# Patient Record
Sex: FEMALE | Race: WHITE | Employment: UNEMPLOYED | URBAN - METROPOLITAN AREA
[De-identification: names, ages, dates, MRNs, and addresses within clinical notes are randomized per-mention and may not be internally consistent; named-entity substitution may affect disease eponyms.]

---

## 2017-02-05 ENCOUNTER — HOSPITAL ENCOUNTER (EMERGENCY)
Age: 22
Discharge: HOME | End: 2017-02-05
Attending: PEDIATRICS | Admitting: PEDIATRICS
Payer: SELF-PAY

## 2017-02-05 ENCOUNTER — APPOINTMENT (OUTPATIENT)
Dept: GENERAL RADIOLOGY | Age: 22
End: 2017-02-05
Attending: PHYSICIAN ASSISTANT
Payer: SELF-PAY

## 2017-02-05 VITALS
DIASTOLIC BLOOD PRESSURE: 71 MMHG | TEMPERATURE: 98.5 F | RESPIRATION RATE: 26 BRPM | WEIGHT: 221.78 LBS | SYSTOLIC BLOOD PRESSURE: 118 MMHG | OXYGEN SATURATION: 100 % | HEART RATE: 119 BPM
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
## 2017-02-05 DIAGNOSIS — S99.912A LEFT ANKLE INJURY, INITIAL ENCOUNTER: ICD-10-CM

## 2017-02-05 PROCEDURE — 74011250637 HC RX REV CODE- 250/637: Performed by: PEDIATRICS

## 2017-02-05 PROCEDURE — 99283 EMERGENCY DEPT VISIT LOW MDM: CPT

## 2017-02-05 PROCEDURE — 73610 X-RAY EXAM OF ANKLE: CPT

## 2017-02-05 RX ORDER — IBUPROFEN 400 MG/1
800 TABLET ORAL
Status: COMPLETED | OUTPATIENT
Start: 2017-02-05 | End: 2017-02-05

## 2017-02-05 RX ADMIN — IBUPROFEN 800 MG: 400 TABLET, FILM COATED ORAL at 21:01

## 2017-02-06 NOTE — DISCHARGE INSTRUCTIONS
We hope that we have addressed all of your medical concerns. The examination and treatment you received in the Emergency Department were for an emergent problem and were not intended as complete care. It is important that you follow up with your healthcare provider(s) for ongoing care. If your symptoms worsen or do not improve as expected, and you are unable to reach your usual health care provider(s), you should return to the Emergency Department. Today's healthcare is undergoing tremendous change, and patient satisfaction surveys are one of the many tools to assess the quality of medical care. You may receive a survey from the [employer] regarding your experience in the Emergency Department. I hope that your experience has been completely positive, particularly the medical care that I provided. As such, please participate in the survey; anything less than excellent does not meet my expectations or intentions. [address] [facility] and [address] [place] [address] participate in nationally recognized quality of care measures. If your blood pressure is greater than 120/80, as reported below, we urge that you seek medical care to address the potential of high blood pressure, commonly known as hypertension. Hypertension can be hereditary or can be caused by certain medical conditions, pain, stress, or \"white coat syndrome. \"       Please make an appointment with your health care provider(s) for follow up of your Emergency Department visit. VITALS:   Patient Vitals for the past 8 hrs:   Temp Pulse Resp BP SpO2   02/05/17 2045 98.5 °F (36.9 °C) (!) 119 26 118/71 100 %          Thank you for allowing us to provide you with medical care today. We realize that you have many choices for your emergency care needs. Please choose us in the future for any continued health care needs. Regards,           April PATRICIA.  Keri, [address] [facility] [address] Office: [phone]            No results found for this or any previous visit (from the past 24 hour(s)). Xr Ankle Lt Min 3 V    Result Date: 2/5/2017  EXAM:  XR ANKLE LT MIN 3 V INDICATION:  fell down stairs, ? fracture. swelling and pain with wt bearing. Left ankle pain. COMPARISON: January 21, 2012. FINDINGS: Three views of the left ankle demonstrate no fracture or disruption of the ankle mortise. There is no other acute osseous or articular abnormality. The soft tissues are within normal limits. IMPRESSION: No acute abnormality. Ankle Sprain: Care Instructions  Your Care Instructions    An ankle sprain can happen when you twist your ankle. The ligaments that support the ankle can get stretched and torn. Often the ankle is swollen and painful. Ankle sprains may take from several weeks to several months to heal. Usually, the more pain and swelling you have, the more severe your ankle sprain is and the longer it will take to heal. You can heal faster and regain strength in your ankle with good home treatment. It is very important to give your ankle time to heal completely, so that you do not easily hurt your ankle again. Follow-up care is a key part of your treatment and safety. Be sure to make and go to all appointments, and call your doctor if you are having problems. It's also a good idea to know your test results and keep a list of the medicines you take. How can you care for yourself at home? · Prop up your foot on pillows as much as possible for the next 3 days. Try to keep your ankle above the level of your heart. This will help reduce the swelling. · Follow your doctor's directions for wearing a splint or elastic bandage. Wrapping the ankle may help reduce or prevent swelling. · Your doctor may give you a splint, a brace, an air stirrup, or another form of ankle support to protect your ankle until it is healed. Wear it as directed while your ankle is healing.  Do not remove it unless your doctor tells you to. After your ankle has healed, ask your doctor whether you should wear the brace when you exercise. · Put ice or cold packs on your injured ankle for 10 to 20 minutes at a time. Try to do this every 1 to 2 hours for the next 3 days (when you are awake) or until the swelling goes down. Put a thin cloth between the ice and your skin. · You may need to use crutches until you can walk without pain. If you do use crutches, try to bear some weight on your injured ankle if you can do so without pain. This helps the ankle heal.  · Take pain medicines exactly as directed. ¨ If the doctor gave you a prescription medicine for pain, take it as prescribed. ¨ If you are not taking a prescription pain medicine, ask your doctor if you can take an over-the-counter medicine. · If you have been given ankle exercises to do at home, do them exactly as instructed. These can promote healing and help prevent lasting weakness. When should you call for help? Call your doctor now or seek immediate medical care if:  · Your pain is getting worse. · Your swelling is getting worse. · Your splint feels too tight or you are unable to loosen it. Watch closely for changes in your health, and be sure to contact your doctor if:  · You are not getting better after 1 week. Where can you learn more? Go to http://carrillo-dexter.info/. Enter L625 in the search box to learn more about \"Ankle Sprain: Care Instructions. \"  Current as of: May 23, 2016  Content Version: 11.1  © 4374-5395 Healthwise, [employer]orporated. Care instructions adapted under license by [employer] (which disclaims liability or warranty for this information). If you have questions about a medical condition or this instruction, always ask your healthcare professional. [address] any warranty or liability for your use of this information.

## 2017-02-06 NOTE — ED PROVIDER NOTES
HPI Comments: 26 yo  female with medical history remarkable for asthma, bipolar 1 disorder, compression fracture of vertebra, depression and calcaneus fracture presenting ambulatory to the ED following a fall at home just PTA. Pt reports tripping down 6 exterior concrete steps, rolled down, possibly hit side of head on railing without LOC and twisted left ankle. Complaint of 10/10, left lateral ankle pain, sharp, constant, radiating into the toes, worse with weight bearing and palpation with minimal improvement with elevation. No other recognized injuries. No fever, headache, sore throat, cough, rhinorrhea, sneezing, SOB, abdominal pain, nausea, vomiting, or urinary complaints. Patient is a 25 y.o. female presenting with ankle problem. The history is provided by the patient. Ankle Injury    This is a new problem. The current episode started less than 1 hour ago. The problem occurs constantly. The problem has not changed since onset. Pain location: left lateral ankle. The quality of the pain is described as sharp. The pain is at a severity of 10/10. The pain is moderate. Associated symptoms include numbness (into toes) and limited range of motion (secondary to pain). Pertinent negatives include no tingling, no back pain and no neck pain. The symptoms are aggravated by standing. The treatment provided no relief. There has been a history of trauma (tripped down exterior stairs and \"rolled\" foot). Past Medical History:   Diagnosis Date    Asthma     Asthma     Bipolar 1 disorder ([facility] [address]     Compression fracture of lumbar vertebra (HCC)     Depression     Fracture closed, calcaneus        History reviewed. No pertinent past surgical history.       Family History:   Problem Relation Age of Onset    Diabetes Neg Hx     Thyroid Disease Neg Hx        Social History     Social History    Marital status: SINGLE     Spouse name: N/A    Number of children: N/A    Years of education: N/A Occupational History    Not on file. Social History Main Topics    Smoking status: Never Smoker    Smokeless tobacco: Not on file    Alcohol use No    Drug use: No    Sexual activity: Not on file     Other Topics Concern    Not on file     Social History Narrative         ALLERGIES: Venom-honey bee and Ceftin [cefuroxime axetil]    Review of Systems   Constitutional: Negative. Negative for chills, fatigue and fever. HENT: Negative. Negative for congestion, ear pain, facial swelling, rhinorrhea, sneezing and sore throat. Eyes: Negative for pain, discharge and itching. Respiratory: Negative for cough, chest tightness and shortness of breath. Cardiovascular: Negative. Negative for chest pain and leg swelling. Gastrointestinal: Negative. Negative for abdominal distention, abdominal pain, constipation, diarrhea, nausea and vomiting. Genitourinary: Negative for difficulty urinating, frequency and urgency. Musculoskeletal: Positive for arthralgias (left lateral ankle). Negative for back pain, joint swelling, neck pain and neck stiffness. Skin: Negative for color change and rash. Neurological: Positive for numbness (into toes). Negative for tingling and headaches. Psychiatric/Behavioral: Negative for confusion and decreased concentration. All other systems reviewed and are negative. Vitals:    02/05/17 2041 02/05/17 2045   BP:  118/71   Pulse:  (!) 119   Resp:  26   Temp:  98.5 °F (36.9 °C)   SpO2:  100%   Weight: 100.6 kg (221 lb 12.5 oz)             Physical Exam   Constitutional: She is oriented to person, place, and time. She appears well-developed and well-nourished. No distress. Obese  female seated on stretcher in NAD   HENT:   Head: Normocephalic and atraumatic. Right Ear: External ear normal.   Left Ear: External ear normal.   Nose: Nose normal.   Mouth/Throat: Oropharynx is clear and moist. No oropharyngeal exudate.    Eyes: Conjunctivae and EOM are normal. Pupils are equal, round, and reactive to light. Right eye exhibits no discharge. Left eye exhibits no discharge. No scleral icterus. Neck: Normal range of motion. Neck supple. Cardiovascular: Regular rhythm. Exam reveals no gallop and no friction rub. No murmur heard. Pulmonary/Chest: Effort normal and breath sounds normal. She has no wheezes. She has no rales. Abdominal: Soft. Bowel sounds are normal. She exhibits no distension. There is no tenderness. There is no rebound and no guarding. Musculoskeletal:        Left ankle: She exhibits decreased range of motion and swelling. She exhibits no ecchymosis, no deformity, no laceration and normal pulse. Tenderness. Lateral malleolus tenderness found. Feet:    Neurological: She is alert and oriented to person, place, and time. No cranial nerve deficit. Coordination normal.   Skin: Skin is warm and dry. She is not diaphoretic. Psychiatric: She has a normal mood and affect. Her behavior is normal.   Nursing note and vitals reviewed. MDM  Number of Diagnoses or Management Options  Diagnosis management comments: 24 yo  female with complaint of left ankle pain and swelling following a mechanical fall down stairs. Pt appears otherwise well w/out any other evidence of trauma. ? Sprain/strain vs fracture of left ankle    Plan  Xray and analgesia. [facility], [address] [facility] [place]         Amount and/or Complexity of Data Reviewed  Tests in the radiology section of CPT®: ordered and reviewed  Discuss the patient with other providers: yes (Dr. Malick Langston)  Independent visualization of images, tracings, or specimens: yes      ED Course       Procedures  Progress note    Patient's results have been reviewed with them.   Patient and/or family have verbally conveyed their understanding and agreement of the patient's signs, symptoms, diagnosis, treatment and prognosis and additionally agree to follow up as recommended or return to the Emergency Room should their condition change prior to follow-up. Discharge instructions have also been provided to the patient with some educational information regarding their diagnosis as well a list of reasons why they would want to return to the ER prior to their follow-up appointment should their condition change. Ryan Figueroa    Discussed case with attending Physician Rose Roberto. Agrees with care and will D/C with follow up. Ryan Sadler    A/P  Fall/ankle injury: Apply ice, elevate the foot. Ibuprofen 600 mg every 6 hrs as needed for pain. Use crutches to ambulate. Follow-up with regular doctor or ortho for re-eval if not improving in 48-72 hrs. Return for any new or worsening.  Ryan Thomson

## 2020-11-13 ENCOUNTER — OFFICE VISIT (OUTPATIENT)
Dept: RHEUMATOLOGY | Age: 25
End: 2020-11-13

## 2020-11-13 VITALS
WEIGHT: 257 LBS | DIASTOLIC BLOOD PRESSURE: 81 MMHG | BODY MASS INDEX: 50.45 KG/M2 | OXYGEN SATURATION: 99 % | HEART RATE: 100 BPM | SYSTOLIC BLOOD PRESSURE: 112 MMHG | HEIGHT: 60 IN | TEMPERATURE: 97.7 F | RESPIRATION RATE: 20 BRPM

## 2020-11-13 DIAGNOSIS — R77.8 ELEVATED TROPONIN LEVEL: ICD-10-CM

## 2020-11-13 DIAGNOSIS — J18.9 RECURRENT PNEUMONIA: Primary | ICD-10-CM

## 2020-11-13 DIAGNOSIS — M79.7 FIBROMYALGIA: ICD-10-CM

## 2020-11-13 DIAGNOSIS — M25.50 POLYARTHRALGIA: ICD-10-CM

## 2020-11-13 DIAGNOSIS — M47.816 LUMBAR FACET ARTHROPATHY: ICD-10-CM

## 2020-11-13 DIAGNOSIS — R06.09 DYSPNEA ON EXERTION: ICD-10-CM

## 2020-11-13 DIAGNOSIS — E66.01 OBESITY, MORBID (HCC): ICD-10-CM

## 2020-11-13 DIAGNOSIS — B99.9 RECURRENT INFECTIONS: ICD-10-CM

## 2020-11-13 PROCEDURE — 99205 OFFICE O/P NEW HI 60 MIN: CPT | Performed by: INTERNAL MEDICINE

## 2020-11-13 RX ORDER — PANTOPRAZOLE SODIUM 40 MG/1
40 TABLET, DELAYED RELEASE ORAL DAILY
COMMUNITY

## 2020-11-13 RX ORDER — CELECOXIB 100 MG/1
100 CAPSULE ORAL 2 TIMES DAILY
Qty: 60 CAP | Refills: 2 | Status: SHIPPED | OUTPATIENT
Start: 2020-11-13 | End: 2021-02-11

## 2020-11-13 NOTE — PATIENT INSTRUCTIONS
1. I don't see any clear evidence of systemic inflammatory disease today, though I agree your symptoms and history are concerning enough to warrant a broad look. 2. [facility] labs at your earliest convenience, I'm looking for evidence of immunodeficiency, ANCA vasculitis, and any evidence of heart inflammation. 3. For pain, keep working with your PCP--I do think you have fibromyalgia contributing to your pain, which can respond to treatments like duloxetine (Cymbalta), amitriptyline (Elavil), or gabapentin (Neurontin). 4. For your low back, I'm referring you to PT for lumbar facet arthropathy and compression fracture--I think TENS may be helpful. 5. Xrays of the hands and chest at your earliest convenience--any [facility] location would be fine so we could see the images. 6. Return in 3 months to see how you're doing, and talk about your results in more detail.

## 2020-11-27 ENCOUNTER — DOCUMENTATION ONLY (OUTPATIENT)
Dept: RHEUMATOLOGY | Age: 25
End: 2020-11-27

## 2020-11-27 NOTE — PROGRESS NOTES
The office received a fax from [facility] stating Celecoxib has been approved as of 11/19/2020, member# [id number].

## 2021-02-17 ENCOUNTER — TELEPHONE (OUTPATIENT)
Dept: RHEUMATOLOGY | Age: 26
End: 2021-02-17

## 2021-02-17 NOTE — TELEPHONE ENCOUNTER
Called patient on 2/17/2021 left voice message on patient voice mail box to call back into the office to change appt type to virtual vist due to expecting bad weather. sdh

## 2021-02-18 LAB
ALBUMIN SERPL-MCNC: 4.1 G/DL (ref 3.9–5)
ALBUMIN/GLOB SERPL: 1.5 {RATIO} (ref 1.2–2.2)
ALP SERPL-CCNC: 82 IU/L (ref 39–117)
ALT SERPL-CCNC: 24 IU/L (ref 0–32)
ANA SER QL IF: NEGATIVE
AST SERPL-CCNC: 19 IU/L (ref 0–40)
BASOPHILS # BLD AUTO: 0.1 X10E3/UL (ref 0–0.2)
BASOPHILS NFR BLD AUTO: 1 %
BILIRUB SERPL-MCNC: 0.4 MG/DL (ref 0–1.2)
BUN SERPL-MCNC: 10 MG/DL (ref 6–20)
BUN/CREAT SERPL: 15 (ref 9–23)
C-ANCA TITR SER IF: NORMAL TITER
CALCIUM SERPL-MCNC: 9.7 MG/DL (ref 8.7–10.2)
CCP IGA+IGG SERPL IA-ACNC: 5 UNITS (ref 0–19)
CHLORIDE SERPL-SCNC: 106 MMOL/L (ref 96–106)
CK SERPL-CCNC: 64 U/L (ref 32–182)
CO2 SERPL-SCNC: 23 MMOL/L (ref 20–29)
CREAT SERPL-MCNC: 0.67 MG/DL (ref 0.57–1)
CRP SERPL-MCNC: 9 MG/L (ref 0–10)
ENA SS-A AB SER IA-ACNC: <20 UNITS
EOSINOPHIL # BLD AUTO: 0.3 X10E3/UL (ref 0–0.4)
EOSINOPHIL NFR BLD AUTO: 4 %
ERYTHROCYTE [DISTWIDTH] IN BLOOD BY AUTOMATED COUNT: 13 % (ref 11.7–15.4)
ERYTHROCYTE [SEDIMENTATION RATE] IN BLOOD BY WESTERGREN METHOD: 13 MM/HR (ref 0–32)
GLOBULIN SER CALC-MCNC: 2.7 G/DL (ref 1.5–4.5)
GLUCOSE SERPL-MCNC: 76 MG/DL (ref 65–99)
HCT VFR BLD AUTO: 44.6 % (ref 34–46.6)
HGB BLD-MCNC: 15.2 G/DL (ref 11.1–15.9)
IGA SERPL-MCNC: 190 MG/DL (ref 87–352)
IGG SERPL-MCNC: 991 MG/DL (ref 586–1602)
IGM SERPL-MCNC: 97 MG/DL (ref 26–217)
IMM GRANULOCYTES # BLD AUTO: 0 X10E3/UL (ref 0–0.1)
IMM GRANULOCYTES NFR BLD AUTO: 0 %
LYMPHOCYTES # BLD AUTO: 3.5 X10E3/UL (ref 0.7–3.1)
LYMPHOCYTES NFR BLD AUTO: 38 %
MCH RBC QN AUTO: 29.3 PG (ref 26.6–33)
MCHC RBC AUTO-ENTMCNC: 34.1 G/DL (ref 31.5–35.7)
MCV RBC AUTO: 86 FL (ref 79–97)
MONOCYTES # BLD AUTO: 0.7 X10E3/UL (ref 0.1–0.9)
MONOCYTES NFR BLD AUTO: 8 %
NEUTROPHILS # BLD AUTO: 4.5 X10E3/UL (ref 1.4–7)
NEUTROPHILS NFR BLD AUTO: 49 %
P-ANCA ATYPICAL TITR SER IF: NORMAL TITER
P-ANCA TITR SER IF: NORMAL TITER
PLATELET # BLD AUTO: 318 X10E3/UL (ref 150–450)
POTASSIUM SERPL-SCNC: 4.5 MMOL/L (ref 3.5–5.2)
PROT SERPL-MCNC: 6.8 G/DL (ref 6–8.5)
RBC # BLD AUTO: 5.19 X10E6/UL (ref 3.77–5.28)
RHEUMATOID FACT SERPL-ACNC: <10 IU/ML (ref 0–13.9)
SODIUM SERPL-SCNC: 143 MMOL/L (ref 134–144)
TROPONIN I SERPL-MCNC: <0.01 NG/ML (ref 0–0.04)
WBC # BLD AUTO: 9.1 X10E3/UL (ref 3.4–10.8)

## 2021-02-23 ENCOUNTER — OFFICE VISIT (OUTPATIENT)
Dept: RHEUMATOLOGY | Age: 26
End: 2021-02-23
Payer: MEDICAID

## 2021-02-23 VITALS
OXYGEN SATURATION: 97 % | TEMPERATURE: 97 F | WEIGHT: 259 LBS | BODY MASS INDEX: 50.85 KG/M2 | DIASTOLIC BLOOD PRESSURE: 80 MMHG | RESPIRATION RATE: 20 BRPM | HEIGHT: 60 IN | SYSTOLIC BLOOD PRESSURE: 112 MMHG | HEART RATE: 94 BPM

## 2021-02-23 DIAGNOSIS — M25.551 RIGHT HIP PAIN: ICD-10-CM

## 2021-02-23 DIAGNOSIS — R06.09 DYSPNEA ON EXERTION: ICD-10-CM

## 2021-02-23 DIAGNOSIS — M47.816 LUMBAR FACET ARTHROPATHY: ICD-10-CM

## 2021-02-23 DIAGNOSIS — M25.50 POLYARTHRALGIA: Primary | ICD-10-CM

## 2021-02-23 DIAGNOSIS — M79.7 FIBROMYALGIA: ICD-10-CM

## 2021-02-23 DIAGNOSIS — J45.21 MILD INTERMITTENT ASTHMA WITH ACUTE EXACERBATION: ICD-10-CM

## 2021-02-23 PROCEDURE — 99215 OFFICE O/P EST HI 40 MIN: CPT | Performed by: INTERNAL MEDICINE

## 2021-02-23 RX ORDER — OMEPRAZOLE 40 MG/1
40 CAPSULE, DELAYED RELEASE ORAL DAILY
COMMUNITY

## 2021-02-23 NOTE — LETTER
3/2/2021 Patient: Sj Taveras YOB: 1995 Date of Visit: 2/23/2021 Ren Rojas [facility] [id number] Via Fax: [phone] Dear Alba Recinos PA-C, Thank you for referring Ms. Ruth Ren to [address] [place] [address] for evaluation. My notes for this consultation are attached. If you have questions, please do not hesitate to call me. I look forward to following your patient along with you.  
 
 
Sincerely, 
 
Claire Fisher MD

## 2021-02-23 NOTE — PROGRESS NOTES
Chief Complaint   Patient presents with    Joint Pain     back,     1. Have you been to the ER, urgent care clinic since your last visit? Hospitalized since your last visit? No    2. Have you seen or consulted any other health care providers outside of the [address] [place] [address] since your last visit? Include any pap smears or colon screening.  Yes Where: PCP

## 2021-02-23 NOTE — PATIENT INSTRUCTIONS
1. I don't see signs of autoimmune inflammation in your joints or blood work today. I'm concerned your hip pain may be related to repeated steroid use--let's obtain xrays of the hips, hands, and chest (looking for lymph nodes or new infiltrates) 2. For the recurrent bronchitis and history of pneumonia, please establish with Pulmonary ASAP, so they can help adjust your inhaler regimen and minimize these uses of prednisone tapers. I'm providing a referral, take this wherever your insurance accepts. 3. For your recurrent urinary tract infections, talk with your ob/gyn about whether there could a nidus of infection within the bladder or another anatomic predisposition, or whether your bladder symptoms could represent interstitial cystitis. 4. Keep working with your gastroenterologist on the recurrent vomiting management. 5. For joint pain, please encourage your physicians to avoid the use of quinolone-type antibiotics (cipro, levaquin, avelox) as these can rarely worsen joint and tendon pains. If your hand becomes acutely swollen and painful, let me know and we'll pursue an MRI. 6. Physical therapy for right hip and low back pains. 7. For fibromyalgia, we usually recommend treating with a combination of medications and lifestyle/activity modifications. Pain-directed medications such as gabapentin or Lyrica, antidepressant-class medications more effective for pain such as SNRI's (duloxetine or milnacripran),or TCA-type medications like Elavil (amitriptyline) which can help with headaches and sleep as well, are all first-line alternatives, and these can be sometimes used in careful combination. For activities, search for \"alek chi for arthritis\" for free 1-hour videos from Jeannette Stanford on [place], which has been shown to be as helpful as any medications we use when done for 30 minutes per day, and can be done in conjunction with medication management. Also, if you can find a compounding pharmacy that can make you naltrexone 4.5mg daily, this has been shown to be helpful for pain in fibromyalgia as well (Arthritis Rheum. 2013 Feb;65(2):529-38). Opiate-type medications have actually been shown to associate with worse pain in the long-term, so we recommend avoiding these for fibromyalgia-related pain. As we exclusively treat inflammatory autoimmune disease from this clinic, we rely on your primary care physician (sometimes in conjunction with a Pain Management physician) to manage fibromyalgia. 8. Return on an as-needed basis for new symptoms.

## 2021-02-23 NOTE — PROGRESS NOTES
REASON FOR VISIT:   Macy Yusuf is a 23 y.o. female with history of pneumonia who is returning for followup of recurrent pneumonia, aperiodic fevers, and recurrent vomiting with hematemesis. HISTORY OF PRESENT ILLNESS     Since last appointment 3mo ago in November has been treated three times for UTI's with suprapubic pressure, dysuria, and malaise; after first two treatments improved with antibiotics (cipro), after the 3rd needed a second round of antibiotics. Started prednisone for bronchitis, she thinks 2nd since around December, typically a 10-day taper. Still having joint pains, mostly hands and now increasingly the right hip over the last month, had to rest for 1-2 days before she was able to get back to moving. Morning-predominant, also late night are the worst. Difficult to work at [employer], now working reduced hours. Hasn't yet established with new Pulmonologist,   Says chest xray done by PCP for cough and chest pain was unremarkable. No hematemesis. Still having episodic vomiting usually with waking in the morning, then a spell will last about 24 hours. Attacks can be days or weeks between. Last attack was last Friday. Denies h/o marijuana use (\"once 5 years ago\"). Working with Jan Chu in Gastroenterology who is planning EGD and colonoscopy. No new rashes. Notices she bruises easily. Disease summary from initial visit:  Feels symptoms started around a 2018 hospitalization shortly after her mother had  from cancer; she was hospitalized for fever, cough, and eventually syncopal event. Diagnosed with pneumonia, LLL collapse [facility]). Within 2 months she was rehospitalized with similar complaints. Most recently says about 3 months ago she was admitted to the ICU for hematemesis c/b troponin elevation. Didn't require blood transfusion. Heart cath done and she says normal though c/b LUE DVT s/p 3mo of Eliquis.   Temps as high as 103 with these hospitalizations, though gets temperatures 100-101 usually at night, estimates 2-4 times per month. Hands seem swollen at night, has difficulty closing fist.   Gets rashes around her neck, last for 2-3 days. Gets red \"wings\" across the cheeks ever since childhood, has been reassured she doesn't have rosacea. Worse in the sun or if she is sick. Hands hurt and can swell at night but no extended morning stiffness. Gets \"lot of chest pain,\" feels easily winded ever since she was hospitalized in 2018; lifting something off of a shelf will make her short of breath and give her racing heart for a few seconds. Thinks could walk up one but not two flights of stairs. Follows with a Pulmonologist for mild intermittent asthma, her dyspnea feels different than her old wheezing though, so she seldom uses the albuterol. Dry cough persists between hospitalizations. Has gained about 50 pounds since these problems started (from 200 to 257). Feels she doesn't eat but weight doesn't go down. Last treatment with prednisone was about 2 months ago; she describes prednisone tapers about once every 2-3 months for presumed asthma exacerbations. At 14yo she says she sustained a calcaneal fracture, vertebral compression fractures, and concussion when she fell through a ceiling and landed on her feet below. Most recent available lumbar CT from 2012 did not detect a vertebral compression fracture. 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 Doesn't follow with eye doctor currently. REVIEW OF SYSTEMS  Negatives as follows:  CONSTITUTlONAL:    +fevers, fatigue, weight gain. HEAD/EYES:              +blurred vision, h/a. Denies eye redness, dry eyes, temporal pain  ENT:                            +angular cheilitis by history, childhood sinus infections (none recent). Denies oral/nasal ulcers, dry mouth, hearing changes. RESPIRATORY:         No pleuritic pain, history of pleural effusions, hemoptysis  CARDIOVASCULAR:             Denies chest pain, history of pericardial effusions  GASTRO:                    Denies heartburn, esophageal dysmotility, abdominal pain, nausea, vomiting, diarrhea, blood in the stool  HEMATOLOGIC:        No easy bruising, purpura, swollen lymph nodes  SKIN:                           Denies alopecia, ulcers, nodules   VASCULAR:                +UE edema. Denies cyanosis, raynaud phenomenon  NEUROLOGIC:           Denies specific muscle weakness, paresthesias   PSYCHIATRIC:           +sleep latency. No snoring, depression, anxiety  MSK:                           +paralumbar pain. No morning stiffness >=1 hour, SI joint pain  Review of systems is as above and is otherwise negative. ALLERGIES  Venom-honey bee and Ceftin [cefuroxime axetil]    MEDICATIONS  Current Outpatient Medications   Medication Sig    omeprazole (PRILOSEC) 40 mg capsule Take 40 mg by mouth daily.  EPINEPHrine (EPIPEN) 0.3 mg/0.3 mL injection 0.3 mL by IntraMUSCular route once as needed for 1 dose.  albuterol (PROVENTIL, VENTOLIN) 90 mcg/Actuation inhaler Take 2 Puffs by inhalation every six (6) hours as needed.  pantoprazole (Protonix) 40 mg tablet Take 40 mg by mouth daily. No current facility-administered medications for this visit. PAST MEDICAL HISTORY  Past Medical History:   Diagnosis Date    Asthma     Asthma     Bipolar 1 disorder ([facility] [address]     Compression fracture of lumbar vertebra (HCC)     Depression     Fracture closed, calcaneus        FAMILY HISTORY  family history includes Arthritis-rheumatoid in her mother; Lupus in her mother. Mother had lupus and rheumatoid arthritis, passed 2/2 complications of breast cancer. Maternal GM had MS. SOCIAL HISTORY  She  reports that she has quit smoking.  She has never used smokeless tobacco. She reports that she does not drink alcohol or use drugs. 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 DATA  Visit Vitals  /80 (BP 1 Location: Right arm, BP Patient Position: Sitting)   Pulse 94   Temp 97 °F (36.1 °C) (Oral)   Resp 20   Ht 5' (1.524 m)   Wt 259 lb (117.5 kg)   LMP 11/23/2020   SpO2 97%   BMI 50.58 kg/m²    Body mass index is 50.58 kg/m². No flowsheet data found. mHAQ 0.875    General:  The patient is well developed, well nourished, alert, and in no apparent distress. Eyes:  Sclera are anicteric. No conjunctival injection. HEENT:  Oropharynx is clear. No oral ulcers. Adequate salivary pooling. No cervical or supraclavicular lymphadenopathy. Lungs:  Clear to auscultation bilaterally, without wheeze or stridor. Normal respiratory effort. Cor:  Regular rate and rhythm. No murmur rub or gallop. Abdomen: Soft, non-tender, without hepatomegaly or masses. Extremities: No calf tenderness or edema. Mild pretibial tenderness, trace edema. No erythema nodosum. Warm and well perfused. Skin:  No malar erythema. No psoriaform patches. Grossly normal nailfold capillaries without sclerodactyly or puffy fingers. Neuro: +SLR on right. No foot or wrist drop. Musculoskeletal:    A comprehensive musculoskeletal exam was performed for all joints of each upper and lower extremity and assessed for swelling, tenderness and range of motion. Results are documented as below:  Pan-positive FMTP. Tenderness throughout fingers without localization to particular joints. Bilateral paralumbar tenderness. Right groin tenderness with internal > external rotation. No evidence of synovitis in the small joints of the hands, wrists, shoulders, elbows, hips, knees or ankles.        Labs:  2/8/21: Cr 0.67, CMP WNL; WBC 9.1, Hgb 15.2, Plt 318, CPK 64, Trop I <0.01, neg CCP and RF; IgG 991  6/1/20: ANCA, ACE WNL  2/2020: Cr 0.86, CMP WNL; WBC 13.1 (78% PMNs, 1.5% eos), Hgb 16.7, Hct 36.8, Plt 406    Imagin20 XR bilateral ankles (result only): Calcific enthesopathies both achilles. 14 CXR: Question of patchy left lower lobe airspace disease. No recent prior studies   available for comparison. Ricyh Cedeño is a 23 y.o. female who presents for evaluation of recurrent bronchitis, urinary tract infections, and vomiting on a background of widespread pain sensitization. She has right hip rotational tenderness concerning for early AVN given repeated steroid courses. Her workup has been reassuring to date against autoimmune inflammatory joint disease or connective tissue disorder. Referring to PT and reviewed management of fibromyalgia. Obtaining xrays to rule out e/o AVN or sarcoidosis and to guide PT or future Pain Management interventions, but barring surprises on these we will follow her on an as-needed basis for new symptoms, are we are only able to manage inflammatory Rheumatic diseases longitudinally from this clinic. 1. Polyarthralgia  - Cont celecoxib as needed  - XR HIPS BI W PELV 3 OR 4 VWS; Future  - XR SPINE LUMB MIN 4 V; Future  - XR HAND RT MIN 3 V; Future  - XR HAND LT MIN 3 V; Future    2. Right hip pain  - XR HIPS BI W PELV 3 OR 4 VWS; Future  - REFERRAL TO PHYSICAL THERAPY    3. Lumbar facet arthropathy  - XR SPINE LUMB MIN 4 V; Future  - REFERRAL TO PHYSICAL THERAPY    4. Dyspnea on exertion  - XR CHEST PA LAT; Future    5. Mild intermittent asthma with acute exacerbation  - REFERRAL TO PULMONARY DISEASE    6. Fibromyalgia  -Fibromyalgia is a disease characterized by chronic widespread musculoskeletal pain. Fibromyalgia is caused by abnormal processing of pain signals in the central nervous system, leading to exaggerated pain responses. Non-pharmacologic therapies such as cardiovascular exercise and Cognitive Behavioral Therapy have been shown to be of benefit ([address], Am J Med 2009).  Dejan Chi in particular has proven efficacy in the treatment of fibromyalgia RADHA Dodd 2010). If pharmacotherapy is pursued, pregabalin (Lyrica), gabapentin (Neurontin), milnacipran (Kathey [place]), and duloxetine (Cymbalta) are FDA approved medications for the treatment of fibromyalgia. Narcotics have not been proven to be efficacious in the treatment of fibromyalgia. In fact, narcotic use in this patient population has been observed to exacerbate depression, and may enhance the hyperalgesia which is characteristic of this condition ([facility] 2006). They also are at increased risk for opioid-induced hyperalgesia due predominantly to central sensitization Arelis MOORE et al. [facility] [place] Clin Rheumatol. 2013 Mar;19(2):72-7). Specifically, a double-blind placebo-controlled trial by Salo Cheung al published in 1995 demonstrated that intravenous morphine did not reduce pain in fibromyalgia patients. A study by Tom escobar published in 2003 showed that fibromyalgia patients taking oral opiates did not experience improvement in their pain at four years of follow up, and also reported increased depression over the last two years of the study. There is subsequent concern that the prolonged use of narcotics to treat fibromyalgia may cause harm to these patients Yumiko Jefferson, Pain 2005). Opioid use in fibromyalgia had poorer symptoms and functional and occupational status compared to nonusers (Joao TOM et al. Pain Res Treat. 5690;9231:010595). We therefore recommend that narcotics be avoided in all patients with fibromyalgia. Furthermore, naltrexone 4.5mg daily has been shown to improve daily pain scores in fibromyalgia in a randomized, controlled clinical trial (Arthritis Rheum. 2013 Feb;65(2):529-38); this can be prescribed through a compounding pharmacy and is a consideration for patients not already dependent on opiate-type medications. For all patients, we recommend Dejan Chi stretching exercises for at least 30 minutes per day.  The Arthritis Foundation has made a videotape of Dejan Chi that she can borrow from [employer], purchase online or watch for free on YouTube. com Dejan Chi for Arthritis. We discussed treating secondary causes, such as sleep apnea, poor sleep quality, depression, anxiety, weight loss, vitamin deficiencies, such as vitamin D, and pursuing aquatherapy. I encouraged her to do [address]. My recommendations were provided to her and she was informed to follow up with her primary care physician for further management of her fibromyalgia. Patient Instructions   1. I don't see signs of autoimmune inflammation in your joints or blood work today. I'm concerned your hip pain may be related to repeated steroid use--let's obtain xrays of the hips, hands, and chest (looking for lymph nodes or new infiltrates)    2. For the recurrent bronchitis and history of pneumonia, please establish with Pulmonary ASAP, so they can help adjust your inhaler regimen and minimize these uses of prednisone tapers. I'm providing a referral, take this wherever your insurance accepts. 3. For your recurrent urinary tract infections, talk with your ob/gyn about whether there could a nidus of infection within the bladder or another anatomic predisposition, or whether your bladder symptoms could represent interstitial cystitis. 4. Keep working with your gastroenterologist on the recurrent vomiting management. 5. For joint pain, please encourage your physicians to avoid the use of quinolone-type antibiotics (cipro, levaquin, avelox) as these can rarely worsen joint and tendon pains. If your hand becomes acutely swollen and painful, let me know and we'll pursue an MRI. 6. Physical therapy for right hip and low back pains. 7. For fibromyalgia, we usually recommend treating with a combination of medications and lifestyle/activity modifications.  Pain-directed medications such as gabapentin or Lyrica, antidepressant-class medications more effective for pain such as SNRI's (duloxetine or milnacripran),or TCA-type medications like Elavil (amitriptyline) which can help with headaches and sleep as well, are all first-line alternatives, and these can be sometimes used in careful combination. For activities, search for \"alek chi for arthritis\" for free 1-hour videos from Kevin Goes on [place], which has been shown to be as helpful as any medications we use when done for 30 minutes per day, and can be done in conjunction with medication management. Also, if you can find a compounding pharmacy that can make you naltrexone 4.5mg daily, this has been shown to be helpful for pain in fibromyalgia as well (Arthritis Rheum. 2013 Feb;65(2):529-38). Opiate-type medications have actually been shown to associate with worse pain in the long-term, so we recommend avoiding these for fibromyalgia-related pain. As we exclusively treat inflammatory autoimmune disease from this clinic, we rely on your primary care physician (sometimes in conjunction with a Pain Management physician) to manage fibromyalgia. 8. Return on an as-needed basis for new symptoms. Orders Placed This Encounter    XR HIPS BI W PELV 3 OR 4 VWS    XR SPINE LUMB MIN 4 V    XR HAND RT MIN 3 V    XR HAND LT MIN 3 V    XR CHEST PA LAT   [facility]    REFERRAL TO PHYSICAL THERAPY    omeprazole (PRILOSEC) 40 mg capsule       Medications: I am having Ruth Ren maintain her albuterol, EPINEPHrine, pantoprazole, and omeprazole. Follow up: as needed for new symptoms    Time in: 8:45  Time out: 9:28  Greater than 50% of the visit was spent counseling the patient on diagnosis and management of fibromyalgia and steroid complications.   Time preparing note day of service: 10 minutes  Total time day of service: 53 minutes    Nessa Pleitez MD    Adult Rheumatology   [facility]  A Part of [facility], [address] [place] [address] Road   Phone [phone]  Fax [phone]

## 2022-10-11 ENCOUNTER — TELEPHONE (OUTPATIENT)
Dept: FAMILY MEDICINE CLINIC | Age: 27
End: 2022-10-11

## 2022-10-11 NOTE — TELEPHONE ENCOUNTER
----- Message from Elena Landaverde sent at 10/10/2022  1:22 PM EDT -----  Subject: Appointment Request    Reason for Call: New Patient/New to Provider Appointment needed: New   Patient Request Appointment    QUESTIONS    Reason for appointment request? No appointments available during search     Additional Information for Provider? patient cannot do a VV new patient   appt, please call her with an in office appt for New patient  ---------------------------------------------------------------------------  --------------  [id number]  [phone]; OK to leave message on voicemail  ---------------------------------------------------------------------------  --------------  SCRIPT ANSWERS  MISHEL Screen: Ming Shah

## 2022-10-11 NOTE — TELEPHONE ENCOUNTER
Called pt, and left a voice message, I was returning her call and to call office back when able. Advised new patients are scheduling in 2023.